# Patient Record
Sex: FEMALE | Race: BLACK OR AFRICAN AMERICAN | NOT HISPANIC OR LATINO
[De-identification: names, ages, dates, MRNs, and addresses within clinical notes are randomized per-mention and may not be internally consistent; named-entity substitution may affect disease eponyms.]

---

## 2017-08-08 ENCOUNTER — RESULT REVIEW (OUTPATIENT)
Age: 42
End: 2017-08-08

## 2018-05-10 ENCOUNTER — OUTPATIENT (OUTPATIENT)
Dept: EMERGENCY DEPT | Facility: HOSPITAL | Age: 43
LOS: 1 days | Discharge: HOME | End: 2018-05-10

## 2018-05-10 ENCOUNTER — RESULT REVIEW (OUTPATIENT)
Age: 43
End: 2018-05-10

## 2018-05-10 ENCOUNTER — TRANSCRIPTION ENCOUNTER (OUTPATIENT)
Age: 43
End: 2018-05-10

## 2018-05-10 VITALS
OXYGEN SATURATION: 100 % | TEMPERATURE: 99 F | RESPIRATION RATE: 18 BRPM | DIASTOLIC BLOOD PRESSURE: 93 MMHG | HEART RATE: 80 BPM | SYSTOLIC BLOOD PRESSURE: 154 MMHG

## 2018-05-10 LAB
ALBUMIN SERPL ELPH-MCNC: 3.7 G/DL — SIGNIFICANT CHANGE UP (ref 3.3–5)
ALP SERPL-CCNC: 39 U/L — LOW (ref 40–120)
ALT FLD-CCNC: 7 U/L — SIGNIFICANT CHANGE UP (ref 4–33)
APTT BLD: 34.4 SEC — SIGNIFICANT CHANGE UP (ref 27.5–37.4)
AST SERPL-CCNC: 13 U/L — SIGNIFICANT CHANGE UP (ref 4–32)
BASOPHILS # BLD AUTO: 0.03 K/UL — SIGNIFICANT CHANGE UP (ref 0–0.2)
BASOPHILS NFR BLD AUTO: 0.3 % — SIGNIFICANT CHANGE UP (ref 0–2)
BILIRUB SERPL-MCNC: < 0.2 MG/DL — LOW (ref 0.2–1.2)
BLD GP AB SCN SERPL QL: NEGATIVE — SIGNIFICANT CHANGE UP
BUN SERPL-MCNC: 8 MG/DL — SIGNIFICANT CHANGE UP (ref 7–23)
CALCIUM SERPL-MCNC: 8.3 MG/DL — LOW (ref 8.4–10.5)
CHLORIDE SERPL-SCNC: 102 MMOL/L — SIGNIFICANT CHANGE UP (ref 98–107)
CO2 SERPL-SCNC: 20 MMOL/L — LOW (ref 22–31)
CREAT SERPL-MCNC: 0.78 MG/DL — SIGNIFICANT CHANGE UP (ref 0.5–1.3)
EOSINOPHIL # BLD AUTO: 0.05 K/UL — SIGNIFICANT CHANGE UP (ref 0–0.5)
EOSINOPHIL NFR BLD AUTO: 0.6 % — SIGNIFICANT CHANGE UP (ref 0–6)
GLUCOSE SERPL-MCNC: 83 MG/DL — SIGNIFICANT CHANGE UP (ref 70–99)
HCG SERPL-ACNC: SIGNIFICANT CHANGE UP MIU/ML
HCT VFR BLD CALC: 31.7 % — LOW (ref 34.5–45)
HGB BLD-MCNC: 10.1 G/DL — LOW (ref 11.5–15.5)
IMM GRANULOCYTES # BLD AUTO: 0.03 # — SIGNIFICANT CHANGE UP
IMM GRANULOCYTES NFR BLD AUTO: 0.3 % — SIGNIFICANT CHANGE UP (ref 0–1.5)
INR BLD: 1.03 — SIGNIFICANT CHANGE UP (ref 0.88–1.17)
LYMPHOCYTES # BLD AUTO: 2.51 K/UL — SIGNIFICANT CHANGE UP (ref 1–3.3)
LYMPHOCYTES # BLD AUTO: 28.6 % — SIGNIFICANT CHANGE UP (ref 13–44)
MCHC RBC-ENTMCNC: 23.5 PG — LOW (ref 27–34)
MCHC RBC-ENTMCNC: 31.9 % — LOW (ref 32–36)
MCV RBC AUTO: 73.7 FL — LOW (ref 80–100)
MONOCYTES # BLD AUTO: 0.71 K/UL — SIGNIFICANT CHANGE UP (ref 0–0.9)
MONOCYTES NFR BLD AUTO: 8.1 % — SIGNIFICANT CHANGE UP (ref 2–14)
NEUTROPHILS # BLD AUTO: 5.44 K/UL — SIGNIFICANT CHANGE UP (ref 1.8–7.4)
NEUTROPHILS NFR BLD AUTO: 62.1 % — SIGNIFICANT CHANGE UP (ref 43–77)
NRBC # FLD: 0 — SIGNIFICANT CHANGE UP
PLATELET # BLD AUTO: 238 K/UL — SIGNIFICANT CHANGE UP (ref 150–400)
PMV BLD: 11.6 FL — SIGNIFICANT CHANGE UP (ref 7–13)
POTASSIUM SERPL-MCNC: 3.5 MMOL/L — SIGNIFICANT CHANGE UP (ref 3.5–5.3)
POTASSIUM SERPL-SCNC: 3.5 MMOL/L — SIGNIFICANT CHANGE UP (ref 3.5–5.3)
PROT SERPL-MCNC: 6.9 G/DL — SIGNIFICANT CHANGE UP (ref 6–8.3)
PROTHROM AB SERPL-ACNC: 11.8 SEC — SIGNIFICANT CHANGE UP (ref 9.8–13.1)
RBC # BLD: 4.3 M/UL — SIGNIFICANT CHANGE UP (ref 3.8–5.2)
RBC # FLD: 15.6 % — HIGH (ref 10.3–14.5)
RH IG SCN BLD-IMP: POSITIVE — SIGNIFICANT CHANGE UP
RH IG SCN BLD-IMP: POSITIVE — SIGNIFICANT CHANGE UP
SODIUM SERPL-SCNC: 136 MMOL/L — SIGNIFICANT CHANGE UP (ref 135–145)
WBC # BLD: 8.77 K/UL — SIGNIFICANT CHANGE UP (ref 3.8–10.5)
WBC # FLD AUTO: 8.77 K/UL — SIGNIFICANT CHANGE UP (ref 3.8–10.5)

## 2018-05-10 RX ORDER — METOCLOPRAMIDE HCL 10 MG
10 TABLET ORAL ONCE
Qty: 0 | Refills: 0 | Status: COMPLETED | OUTPATIENT
Start: 2018-05-10 | End: 2018-05-10

## 2018-05-10 RX ORDER — SODIUM CHLORIDE 9 MG/ML
1000 INJECTION, SOLUTION INTRAVENOUS ONCE
Qty: 0 | Refills: 0 | Status: COMPLETED | OUTPATIENT
Start: 2018-05-10 | End: 2018-05-10

## 2018-05-10 RX ADMIN — SODIUM CHLORIDE 1000 MILLILITER(S): 9 INJECTION, SOLUTION INTRAVENOUS at 22:30

## 2018-05-10 RX ADMIN — Medication 10 MILLIGRAM(S): at 22:27

## 2018-05-10 NOTE — ED ADULT NURSE NOTE - CHIEF COMPLAINT QUOTE
Pt sent to ED by OBGYN for vaginal bleeding.  Pt is 6 weeks pregnant.  [id number].  Pt rpts using 2 pads per hour with large clots. Had US done today but is unsure of results.  PT co lower back pain but denies pelvic pain.  Denies lightheadedness, dizziness, or weakness.  Pmhx HTN and a clotting disorder she cannot remember the name of.

## 2018-05-10 NOTE — ED PROVIDER NOTE - OBJECTIVE STATEMENT
42F hx G10(?)P208(?)2, multiple hx prior spontaneous ab 2/2 hypercoagulability dx (unable to recall name), HTN, presents with a cc of vaginal bleeding x4 days, c/w prior episodes of first term losses, went to OBGYN today ([facility]) was told c/f incomplete ab, however instructed pt to present to ED given amount of blood loss, hx of x1 blood transfusion prior in setting of . No prior hx transfusion reaction. Also notes worsening weakness, fatigue, lightheadedness in setting of blood loss. Denies n/v/f/c/cp/sob. Denies headache, Denies chest palpitations, abdominal pain. Denies dysuria, hematochezia, BRBPR, tarry stools, diarrhea, constipation.

## 2018-05-10 NOTE — ED ADULT TRIAGE NOTE - CHIEF COMPLAINT QUOTE
Pt sent to ED by OBGYN for vaginal bleeding.  Pt is 6 weeks pregnant.  [id number].  Pt rpts using 2 pads per hour with large clots. Had US done today but is unsure of results.  PT co lower back pain but denies pelvic pain.  Denies lightheadedness, dizziness, or weakness.  Pmhx HTN and a clotting disorder she cannot remember the name of.

## 2018-05-10 NOTE — ED ADULT NURSE NOTE - OBJECTIVE STATEMENT
pt a&ox3, ambulatory, arrives to ed room 7. pt states she is approximately 6 weeks pregnant. Pt has had progressively increasing vaginal bleeding since Sunday, worst today. Pt currently saturating 2 pads every hour with clotting. Pt went to OBGYN today, pregnancy confirmed with urine, US in office although patient does not know the results, sent to ED for bleeding. Pt also states "my blood clots too much, that's why I have so many miscarriages. When I had my other children I needed to be on a blood thinner during the pregnancy". pt denies pain or dizziness but attests to some generalized weakness. VSS at this time. IV placed, labs drawn and sent to lab. Pt was evaluated by ed provider. Pending US.

## 2018-05-10 NOTE — ED PROVIDER NOTE - ATTENDING CONTRIBUTION TO CARE
Locurto  pt c/o heavy vag bleeding  in setting of early pregnancy  s/p multiple miscarriages  due to hypercoagulable state (?which)    heavy vaginal bleeding 2 days ago and today  no presyncope  no CP  SOB  exam  pt in NAD clear lungs  Card RRR S1S2 abd benign  nl strength b/l  ambulatory in ED  pelvic per resident  pooling of blood in vault  Plan  labs  US to eval Locurto  pt c/o heavy vag bleeding  in setting of early pregnancy  s/p multiple miscarriages  due to hypercoagulable state (?which)    heavy vaginal bleeding 2 days ago and today  no presyncope  no CP  SOB  exam  pt in NAD clear lungs  Card RRR S1S2 abd benign  nl strength b/l  ambulatory in ED  pelvic per resident  pooling of blood in vault  Plan  labs  US to eval pregnancy

## 2018-05-10 NOTE — ED PROVIDER NOTE - MEDICAL DECISION MAKING DETAILS
42F hx G10(?)P208(?)2, multiple hx prior spontaneous ab 2/2 hypercoagulability dx (unable to recall name), HTN, presents with a cc of vaginal bleeding x4 days, c/w prior episodes of first term losses, went to OBGYN today ([facility]marie) was told c/f incomplete ab, however instructed pt to present to ED given amount of blood loss, hx of x1 blood transfusion prior in setting of . exam vss well appearing, cristina bleeding in vaginal vault w/ passage of large clots, c/f threatened vs incomplete ab, will get labs cbc cmp ua hcg tvus, ivf, reassess

## 2018-05-11 VITALS
RESPIRATION RATE: 17 BRPM | SYSTOLIC BLOOD PRESSURE: 96 MMHG | DIASTOLIC BLOOD PRESSURE: 65 MMHG | OXYGEN SATURATION: 99 % | TEMPERATURE: 98 F | HEART RATE: 75 BPM

## 2018-05-11 DIAGNOSIS — O03.4 INCOMPLETE SPONTANEOUS ABORTION WITHOUT COMPLICATION: ICD-10-CM

## 2018-05-11 LAB
ANISOCYTOSIS BLD QL: SLIGHT — SIGNIFICANT CHANGE UP
APPEARANCE UR: CLEAR — SIGNIFICANT CHANGE UP
BILIRUB UR-MCNC: NEGATIVE — SIGNIFICANT CHANGE UP
BLOOD UR QL VISUAL: HIGH
COLOR SPEC: HIGH
GLUCOSE UR-MCNC: NEGATIVE — SIGNIFICANT CHANGE UP
HCT VFR BLD CALC: 26.3 % — LOW (ref 34.5–45)
HGB BLD-MCNC: 8.5 G/DL — LOW (ref 11.5–15.5)
HYPOCHROMIA BLD QL: SLIGHT — SIGNIFICANT CHANGE UP
KETONES UR-MCNC: SIGNIFICANT CHANGE UP
LEUKOCYTE ESTERASE UR-ACNC: SIGNIFICANT CHANGE UP
LG PLATELETS BLD QL AUTO: SLIGHT — SIGNIFICANT CHANGE UP
MANUAL SMEAR VERIFICATION: SIGNIFICANT CHANGE UP
MCHC RBC-ENTMCNC: 23.8 PG — LOW (ref 27–34)
MCHC RBC-ENTMCNC: 32.3 % — SIGNIFICANT CHANGE UP (ref 32–36)
MCV RBC AUTO: 73.7 FL — LOW (ref 80–100)
MICROCYTES BLD QL: SLIGHT — SIGNIFICANT CHANGE UP
MUCOUS THREADS # UR AUTO: SIGNIFICANT CHANGE UP
NITRITE UR-MCNC: NEGATIVE — SIGNIFICANT CHANGE UP
NRBC # FLD: 0 — SIGNIFICANT CHANGE UP
OVALOCYTES BLD QL SMEAR: SLIGHT — SIGNIFICANT CHANGE UP
PH UR: 6 — SIGNIFICANT CHANGE UP (ref 4.6–8)
PLATELET # BLD AUTO: 195 K/UL — SIGNIFICANT CHANGE UP (ref 150–400)
PLATELET COUNT - ESTIMATE: NORMAL — SIGNIFICANT CHANGE UP
PMV BLD: 11.1 FL — SIGNIFICANT CHANGE UP (ref 7–13)
PROT UR-MCNC: 30 MG/DL — HIGH
RBC # BLD: 3.57 M/UL — LOW (ref 3.8–5.2)
RBC # FLD: 15.2 % — HIGH (ref 10.3–14.5)
RBC CASTS # UR COMP ASSIST: >50 — HIGH (ref 0–?)
SP GR SPEC: 1.02 — SIGNIFICANT CHANGE UP (ref 1–1.04)
UROBILINOGEN FLD QL: NORMAL MG/DL — SIGNIFICANT CHANGE UP
WBC # BLD: 8.6 K/UL — SIGNIFICANT CHANGE UP (ref 3.8–10.5)
WBC # FLD AUTO: 8.6 K/UL — SIGNIFICANT CHANGE UP (ref 3.8–10.5)
WBC UR QL: HIGH (ref 0–?)

## 2018-05-11 RX ORDER — SODIUM CHLORIDE 9 MG/ML
1000 INJECTION, SOLUTION INTRAVENOUS
Qty: 0 | Refills: 0 | Status: DISCONTINUED | OUTPATIENT
Start: 2018-05-11 | End: 2018-05-11

## 2018-05-11 RX ORDER — ONDANSETRON 8 MG/1
4 TABLET, FILM COATED ORAL ONCE
Qty: 0 | Refills: 0 | Status: DISCONTINUED | OUTPATIENT
Start: 2018-05-11 | End: 2018-05-11

## 2018-05-11 RX ORDER — IBUPROFEN 200 MG
3 TABLET ORAL
Qty: 0 | Refills: 0 | COMMUNITY
Start: 2018-05-11

## 2018-05-11 RX ORDER — FENTANYL CITRATE 50 UG/ML
50 INJECTION INTRAVENOUS
Qty: 0 | Refills: 0 | Status: DISCONTINUED | OUTPATIENT
Start: 2018-05-11 | End: 2018-05-11

## 2018-05-11 RX ADMIN — SODIUM CHLORIDE 125 MILLILITER(S): 9 INJECTION, SOLUTION INTRAVENOUS at 03:27

## 2018-05-11 NOTE — ASU DISCHARGE PLAN (ADULT/PEDIATRIC). - ACTIVITY LEVEL
no douching/nothing per rectum/no tampons/no intercourse/weight bearing as tolerated/nothing per vagina/no tub baths

## 2018-05-11 NOTE — ASU DISCHARGE PLAN (ADULT/PEDIATRIC). - MEDICATION SUMMARY - MEDICATIONS TO TAKE
I will START or STAY ON the medications listed below when I get home from the hospital:    ibuprofen 200 mg oral tablet  -- 3 tab(s) by mouth every 6 hours, As Needed  -- Indication: For Pain    acetaminophen 500 mg oral tablet  -- 2 tab(s) by mouth every 6 hours, As Needed  -- Indication: For Pain    doxycycline monohydrate 100 mg oral capsule  -- 1 cap(s) by mouth 2 times a day   -- Avoid prolonged or excessive exposure to direct and/or artificial sunlight while taking this medication.  Do not take this drug if you are pregnant.  Finish all this medication unless otherwise directed by prescriber.  Medication should be taken with plenty of water.    -- Indication: For Infection prevention    NIFEdipine 60 mg oral tablet, extended release  -- 1 tab(s) by mouth once a day  -- continue until seen by your cardiologist  -- Indication: For hypertension    ferrous sulfate 325 mg (65 mg elemental iron) oral tablet  -- 1 tab(s) by mouth 2 times a day  -- Indication: For low blood counts    docusate sodium 100 mg oral tablet  -- 1 tab(s) by mouth 2 times a day, As Needed for constipation  -- Indication: For as needed for possible constipation from iron pills

## 2018-05-11 NOTE — H&P ADULT - PROBLEM SELECTOR PLAN 1
- Add on to OR for D&C, consents obtained  - 2U pRBC placed on hold for OR  - NPO  - IVF    Pt seen and examined with Dr. Finn Byrne, PGY-2  Pager #[phone]

## 2018-05-11 NOTE — ED ADULT NURSE REASSESSMENT NOTE - NS ED NURSE REASSESS COMMENT FT1
pt left unit for US a&ox3, respirations even and unlabored, in NAD. LR infusing without s.s of infiltration.
Patient's clothing/valuables brought to security by PCA. Sealing of folder witnessed by PCA and patient. Pt left to unit a&ox3, respirations even and unlabored, in NAD. IV intact. Pt is awake and alert.
RUBIN Lindsey brought property sheet to OR, placed in patient's chart.

## 2018-05-11 NOTE — ASU DISCHARGE PLAN (ADULT/PEDIATRIC). - NOTIFY
GYN Fever>100.4/Numbness, color, or temperature change to extremity/Bleeding that does not stop/Persistent Nausea and Vomiting/Unable to Urinate/Pain not relieved by Medications/Inability to Tolerate Liquids or Foods

## 2018-05-11 NOTE — H&P ADULT - HISTORY OF PRESENT ILLNESS
43 y/o ,0,10,2 LMP  presenting to ED with vaginal bleeding. Pt reports bleeding started 4 days ago, and at the same time she discovered she was pregnant. It was moderate at first, then got better on , but yesterday she started bleeding heavily and went through 10 pads during the day with passage of clots. She went and saw her OB Dr. Higuera who examined her and did an ultrasound, told her she was having a miscarriage, but that she was bleeding a lot and needs to come to the ED. She reports some cramping earlier which is better now, but the bleeding is still persisting. She deneis CP, SOB, lightheadedness, N/V, fevers  OBHx: 2x c/s, last4 years ago, 10x miscarriages, most of which required D&C. Pt reports she was told she had a hypercoagulable state that was causing the recurrent miscarriages  GynHx: Denies  PMH: HTN, denies any bleeding disorders  SHx: c/s x2  Meds: amlodipine  All: NKDA 41 y/o ,0,10,2 LMP  presenting to ED with vaginal bleeding. Pt reports bleeding started 4 days ago, and at the same time she discovered she was pregnant. It was moderate at first, then got better on , but yesterday she started bleeding heavily and went through 10 pads during the day with passage of clots. She went and saw her OB Dr. Higuera who examined her and did an ultrasound, told her she was having a miscarriage, but that she was bleeding a lot and needs to come to the ED. She reports some cramping earlier which is better now, but the bleeding is still persisting. She deneis CP, SOB, lightheadedness, N/V, fevers. Pt last ate at 4:30 pm on 5/10.  OBHx: 2x c/s, last4 years ago, 10x miscarriages, most of which required D&C. Pt reports she was told she had a hypercoagulable state that was causing the recurrent miscarriages  GynHx: Denies  PMH: HTN, denies any bleeding disorders  SHx: c/s x2  Meds: amlodipine  All: NKDA

## 2018-05-11 NOTE — ASU DISCHARGE PLAN (ADULT/PEDIATRIC). - MEDICATION SUMMARY - MEDICATIONS TO STOP TAKING
I will STOP taking the medications listed below when I get home from the hospital:    Lovenox  -- 40 milligram(s) injectable once a day

## 2018-05-11 NOTE — H&P ADULT - NSHPPHYSICALEXAM_GEN_ALL_CORE
VS  T(C): 36.9 (05-10-18 @ 21:55)  HR: 75 (05-11-18 @ 00:30)  BP: 112/68 (05-11-18 @ 00:30)  RR: 16 (05-11-18 @ 00:30)  SpO2: 100% (05-11-18 @ 00:30)    Physical Exam:  General: NAD  Abdomen: Soft, non-tender, non-distended  Pelvic: Labia wnl w/o lesions or erythema, significant bleeding with clots in vaginal vault with pt beign examined 2 times 30 minutes apart. Each time roughly 60 mL of clots were removed with trickle of blood continuing to come from cervix, cervix dilated to one centimeter, uterus not enlarged, adnexa w/o masses and tenderness

## 2018-05-11 NOTE — H&P ADULT - NSHPLABSRESULTS_GEN_ALL_CORE
10.1   8.77  )-----------( 238      ( 05-10 @ 21:45 )             31.7     05-10 @ 21:45    136  |  102  |  8   ----------------------------<  83  3.5   |  20  |  0.78    Ca    8.3      05-10 @ 21:45    TPro  6.9  /  Alb  3.7  /  TBili  < 0.2  /  DBili  x   /  AST  13  /  ALT  7   /  AlkPhos  39  05-10 @ 21:45    PT/INR - ( 05-10 @ 21:45 )   PT: 11.8 SEC;   INR: 1.03     PTT - ( 05-10 @ 21:45 )  PTT:34.4 SEC    HCG Quantitative, Serum (05.10.18 @ 21:45)    HCG Quantitative, Serum: 81722    Blood Type: A Positive  Antibody Screen: --    TVUS: Endometrium thickened to 1.3 cm, full read pending

## 2018-05-11 NOTE — BRIEF OPERATIVE NOTE - OPERATION/FINDINGS
anteverted uterus, 1 cm dilated cervix, POC within uterine cavity anteverted uterus, 1 cm dilated cervix, POC within uterine cavity. Cavity slightly irregular.

## 2018-05-11 NOTE — BRIEF OPERATIVE NOTE - PROCEDURE
<<-----Click on this checkbox to enter Procedure Suction curettage of uterus  05/11/2018    [facility]  [facility]

## 2018-05-12 LAB
BACTERIA UR CULT: SIGNIFICANT CHANGE UP
SPECIMEN SOURCE: SIGNIFICANT CHANGE UP

## 2018-05-21 RX ORDER — ACETAMINOPHEN 500 MG
2 TABLET ORAL
Qty: 0 | Refills: 0 | COMMUNITY

## 2018-05-21 RX ORDER — DOCUSATE SODIUM 100 MG
1 CAPSULE ORAL
Qty: 0 | Refills: 0 | COMMUNITY

## 2018-05-21 RX ORDER — FERROUS SULFATE 325(65) MG
1 TABLET ORAL
Qty: 0 | Refills: 0 | COMMUNITY

## 2019-02-11 NOTE — ASU DISCHARGE PLAN (ADULT/PEDIATRIC). - A. DRIVE A CAR, OPERATE POWER TOOLS OR MACHINERY
History and Physical 
Primary Care Provider: Octaviano Haynes MD 
 
Subjective: Juanito Mcpherson is a 59 y.o. female with PMH of breast ca s/p lumpectomy and radiation, htn, hld who presents with back pain. Has been having symptoms for the past 2 weeks. Meet with PCP and underwent outpt MRI last Thursday found remarkable for L5-S1 vertebral osteomyelitis with epidural abscess and severe bilateral canal foraminal stenosis. Pain in lumbar back without radiation. Exacerbated with movement. Denies fever, chills, focal motor or sensory deficits. Otherwise denies acute complaints at this time. Review of Systems: A comprehensive review of systems was negative except for that written in the History of Present Illness. Past Medical History:  
Diagnosis Date  Breast cancer [facility]) Feb. and March, 2004 Right breast lumpectomy with radiation.  Ductal carcinoma ([facility] [address] right  Ductal carcinoma in situ of breast 2004 Right breast lumpectomy with radiation.  HTN (hypertension)  Hypercholesterolemia  Menopause LMP unknown  S/P radiation therapy 2004 Right breast lumpectomy Past Surgical History:  
Procedure Laterality Date   TRIM SKIN LESION  7-7-06  
 skin lesion right breast inframammary fold  HX BREAST BIOPSY  1-4-04  
 right; dr. Leslie Vivas  HX BREAST LUMPECTOMY  2-16-04  
 right; dr. Leslie Vivas  HX BREAST LUMPECTOMY  3-17-04  
 right Prior to Admission medications Medication Sig Start Date End Date Taking? Authorizing Provider  
atorvastatin (LIPITOR) 10 mg tablet Take 10 mg by mouth every evening. Yes Provider, Historical  
therapeutic multivitamin (THERAGRAN) tablet Take 1 Tab by mouth daily. Yes Provider, Historical  
cyclobenzaprine (FLEXERIL) 10 mg tablet Take 5 mg by mouth three (3) times daily (with meals).    Yes Provider, Historical  
acetaminophen (MAPAP EXTRA STRENGTH) 500 mg tablet Take 1,000 mg by mouth every six (6) hours as needed for Pain. Yes Provider, Historical  
cyanocobalamin 1,000 mcg tablet Take 1,000 mcg by mouth daily. Yes Provider, Historical  
potassium chloride SR (KLOR-CON 10) 10 mEq tablet Take 10 mEq by mouth daily. Yes Provider, Historical  
venlafaxine-SR (EFFEXOR XR) 150 mg capsule Take 150 mg by mouth every evening. Yes Provider, Historical  
hydrochlorothiazide (HYDRODIURIL) 25 mg tablet Take 25 mg by mouth daily. Yes Provider, Historical  
CALCIUM CARBONATE/VITAMIN D3 (CALTRATE 600 + D PO) Take 1 Tab by mouth daily. Yes Provider, Historical  
doxycycline hyclate (VIBRAMYCIN) 20 mg Cap capsule Take 20 mg by mouth. 2 tabs     Provider, Historical  
 
No Known Allergies Family History Problem Relation Age of Onset  Hypertension Mother  Kidney Disease Mother  Stroke Mother  Cancer Father   
     lymphoma  Cancer Sister   
     lukemia SOCIAL HISTORY: 
Social History Socioeconomic History  Marital status: SINGLE Spouse name: Not on file  Number of children: Not on file  Years of education: Not on file  Highest education level: Not on file Social Needs  Financial resource strain: Not on file  Food insecurity - worry: Not on file  Food insecurity - inability: Not on file  Transportation needs - medical: Not on file  Transportation needs - non-medical: Not on file Occupational History  Not on file Tobacco Use  Smoking status: Former Smoker Packs/day: 1.00 Years: 30.00 Pack years: 30.00 Last attempt to quit: 2010 Years since quittin.1  Smokeless tobacco: Never Used Substance and Sexual Activity  Alcohol use: No  
 Drug use: Not on file  Sexual activity: Not on file Other Topics Concern  Not on file Social History Narrative  Not on file FUNCTIONAL STATUS PRIOR TO HOSPITALIZATION (including history of recent falls): ambulatory Objective: Physical Exam:  
General:    Alert, cooperative, no distress, appears stated age. HEENT: Atraumatic, anicteric sclerae Neck:  Supple, symmetrical 
Lungs:   Clear to auscultation bilaterally. No Wheezing or Rhonchi. No rales. No Accessory muscle use. Heart:   Regular  rhythm,  Normal S1 and S2   No edema Abdomen:   Soft, non-tender. Not distended. Bowel sounds normal.  No rebound Extremities: No cyanosis. No clubbing Skin:     Warm, dry Neurologic: No gross focal neuro deficit, decreased ROM in LEs bilaterally 2/2 pain, 5/5 strength bilat in upper and lower ext, sensation intact. CNs grossly intact Data Review: All diagnostic labs and studies have been reviewed. Assessment:  
 
Active Problems: 
  Epidural abscess (2/11/2019) Subacute back pain. MRI 2/6 remarkable for L5-S1 vertebral osteomyelitis with epidural abscess and severe bilateral foraminal stenosis. Does not meet sepsis criteria at this time. Hypokalemia Breast ca s/p lumpectomy and radiation PMH of htn, hld Plan:  
 
Neurosurgery already consulted in ED 
IV zosyn and vanco for now Follow up cultures Gentle IVF hydration Monitor and replace lytes PPX: SCDs Signed By: Doug Jimenez MD   
 February 11, 2019 Statement Selected
